# Patient Record
Sex: MALE | Race: WHITE | NOT HISPANIC OR LATINO | URBAN - METROPOLITAN AREA
[De-identification: names, ages, dates, MRNs, and addresses within clinical notes are randomized per-mention and may not be internally consistent; named-entity substitution may affect disease eponyms.]

---

## 2024-02-26 ENCOUNTER — LAB (OUTPATIENT)
Dept: LAB | Facility: LAB | Age: 39
End: 2024-02-26
Payer: COMMERCIAL

## 2024-02-26 DIAGNOSIS — Z79.899 OTHER LONG TERM (CURRENT) DRUG THERAPY: ICD-10-CM

## 2024-02-26 DIAGNOSIS — M25.50 PAIN IN UNSPECIFIED JOINT: Primary | ICD-10-CM

## 2024-02-26 LAB
ALBUMIN SERPL-MCNC: 4.9 G/DL (ref 3.5–5)
ALP BLD-CCNC: 58 U/L (ref 35–125)
ALT SERPL-CCNC: 27 U/L (ref 5–40)
AST SERPL-CCNC: 20 U/L (ref 5–40)
BASOPHILS # BLD AUTO: 0.02 X10*3/UL (ref 0–0.1)
BASOPHILS NFR BLD AUTO: 0.3 %
BILIRUB DIRECT SERPL-MCNC: <0.2 MG/DL (ref 0–0.2)
BILIRUB SERPL-MCNC: <0.2 MG/DL (ref 0.1–1.2)
CK SERPL-CCNC: 86 U/L (ref 24–195)
CRP SERPL-MCNC: <0.3 MG/DL (ref 0–2)
EOSINOPHIL # BLD AUTO: 0.05 X10*3/UL (ref 0–0.7)
EOSINOPHIL NFR BLD AUTO: 0.8 %
ERYTHROCYTE [DISTWIDTH] IN BLOOD BY AUTOMATED COUNT: 11.9 % (ref 11.5–14.5)
ERYTHROCYTE [SEDIMENTATION RATE] IN BLOOD BY WESTERGREN METHOD: 6 MM/H (ref 0–15)
HCT VFR BLD AUTO: 43.5 % (ref 41–52)
HGB BLD-MCNC: 14.6 G/DL (ref 13.5–17.5)
IMM GRANULOCYTES # BLD AUTO: 0.02 X10*3/UL (ref 0–0.7)
IMM GRANULOCYTES NFR BLD AUTO: 0.3 % (ref 0–0.9)
LYMPHOCYTES # BLD AUTO: 1.09 X10*3/UL (ref 1.2–4.8)
LYMPHOCYTES NFR BLD AUTO: 16.9 %
MCH RBC QN AUTO: 29.8 PG (ref 26–34)
MCHC RBC AUTO-ENTMCNC: 33.6 G/DL (ref 32–36)
MCV RBC AUTO: 89 FL (ref 80–100)
MONOCYTES # BLD AUTO: 0.44 X10*3/UL (ref 0.1–1)
MONOCYTES NFR BLD AUTO: 6.8 %
NEUTROPHILS # BLD AUTO: 4.82 X10*3/UL (ref 1.2–7.7)
NEUTROPHILS NFR BLD AUTO: 74.9 %
NRBC BLD-RTO: 0 /100 WBCS (ref 0–0)
PLATELET # BLD AUTO: 217 X10*3/UL (ref 150–450)
PROT SERPL-MCNC: 7.3 G/DL (ref 5.9–7.9)
RBC # BLD AUTO: 4.9 X10*6/UL (ref 4.5–5.9)
WBC # BLD AUTO: 6.4 X10*3/UL (ref 4.4–11.3)

## 2024-02-26 PROCEDURE — 36415 COLL VENOUS BLD VENIPUNCTURE: CPT

## 2024-02-26 PROCEDURE — 86140 C-REACTIVE PROTEIN: CPT

## 2024-02-26 PROCEDURE — 86038 ANTINUCLEAR ANTIBODIES: CPT

## 2024-02-26 PROCEDURE — 83519 RIA NONANTIBODY: CPT

## 2024-02-26 PROCEDURE — 82550 ASSAY OF CK (CPK): CPT

## 2024-02-26 PROCEDURE — 85025 COMPLETE CBC W/AUTO DIFF WBC: CPT

## 2024-02-26 PROCEDURE — 85652 RBC SED RATE AUTOMATED: CPT

## 2024-02-26 PROCEDURE — 83516 IMMUNOASSAY NONANTIBODY: CPT

## 2024-02-26 PROCEDURE — 80076 HEPATIC FUNCTION PANEL: CPT

## 2024-02-28 LAB
ACHR BLOCK AB/ACHR TOTAL SFR SER: 13 % (ref 0–26)
ANA SER QL HEP2 SUBST: NEGATIVE

## 2024-02-29 LAB
ACHR BIND AB SER-SCNC: 0 NMOL/L (ref 0–0.4)
ACHR MOD AB/ACHR TOTAL SFR SER: 1 %

## 2024-05-24 ENCOUNTER — LAB (OUTPATIENT)
Dept: LAB | Facility: LAB | Age: 39
End: 2024-05-24
Payer: COMMERCIAL

## 2024-05-24 DIAGNOSIS — M25.50 PAIN IN UNSPECIFIED JOINT: Primary | ICD-10-CM

## 2024-05-24 DIAGNOSIS — Z79.899 OTHER LONG TERM (CURRENT) DRUG THERAPY: ICD-10-CM

## 2024-05-24 LAB
ALBUMIN SERPL-MCNC: 5 G/DL (ref 3.5–5)
ALP BLD-CCNC: 54 U/L (ref 35–125)
ALT SERPL-CCNC: 24 U/L (ref 5–40)
AST SERPL-CCNC: 19 U/L (ref 5–40)
BASOPHILS # BLD AUTO: 0.03 X10*3/UL (ref 0–0.1)
BASOPHILS NFR BLD AUTO: 0.5 %
BILIRUB DIRECT SERPL-MCNC: <0.2 MG/DL (ref 0–0.2)
BILIRUB SERPL-MCNC: 0.3 MG/DL (ref 0.1–1.2)
CREAT SERPL-MCNC: 1.2 MG/DL (ref 0.4–1.6)
CRP SERPL-MCNC: <0.3 MG/DL (ref 0–2)
EGFRCR SERPLBLD CKD-EPI 2021: 80 ML/MIN/1.73M*2
EOSINOPHIL # BLD AUTO: 0.04 X10*3/UL (ref 0–0.7)
EOSINOPHIL NFR BLD AUTO: 0.6 %
ERYTHROCYTE [DISTWIDTH] IN BLOOD BY AUTOMATED COUNT: 11.7 % (ref 11.5–14.5)
ERYTHROCYTE [SEDIMENTATION RATE] IN BLOOD BY WESTERGREN METHOD: 2 MM/H (ref 0–15)
HCT VFR BLD AUTO: 44.2 % (ref 41–52)
HGB BLD-MCNC: 15.2 G/DL (ref 13.5–17.5)
IMM GRANULOCYTES # BLD AUTO: 0.04 X10*3/UL (ref 0–0.7)
IMM GRANULOCYTES NFR BLD AUTO: 0.6 % (ref 0–0.9)
LYMPHOCYTES # BLD AUTO: 1.28 X10*3/UL (ref 1.2–4.8)
LYMPHOCYTES NFR BLD AUTO: 20.7 %
MCH RBC QN AUTO: 30.1 PG (ref 26–34)
MCHC RBC AUTO-ENTMCNC: 34.4 G/DL (ref 32–36)
MCV RBC AUTO: 88 FL (ref 80–100)
MONOCYTES # BLD AUTO: 0.41 X10*3/UL (ref 0.1–1)
MONOCYTES NFR BLD AUTO: 6.6 %
NEUTROPHILS # BLD AUTO: 4.38 X10*3/UL (ref 1.2–7.7)
NEUTROPHILS NFR BLD AUTO: 71 %
NRBC BLD-RTO: 0 /100 WBCS (ref 0–0)
PLATELET # BLD AUTO: 231 X10*3/UL (ref 150–450)
PROT SERPL-MCNC: 7.4 G/DL (ref 5.9–7.9)
RBC # BLD AUTO: 5.05 X10*6/UL (ref 4.5–5.9)
WBC # BLD AUTO: 6.2 X10*3/UL (ref 4.4–11.3)

## 2024-05-24 PROCEDURE — 80076 HEPATIC FUNCTION PANEL: CPT

## 2024-05-24 PROCEDURE — 86140 C-REACTIVE PROTEIN: CPT

## 2024-05-24 PROCEDURE — 36415 COLL VENOUS BLD VENIPUNCTURE: CPT

## 2024-05-24 PROCEDURE — 85025 COMPLETE CBC W/AUTO DIFF WBC: CPT

## 2024-05-24 PROCEDURE — 85652 RBC SED RATE AUTOMATED: CPT

## 2024-05-24 PROCEDURE — 82565 ASSAY OF CREATININE: CPT

## 2024-08-08 ENCOUNTER — LAB (OUTPATIENT)
Dept: LAB | Facility: LAB | Age: 39
End: 2024-08-08
Payer: COMMERCIAL

## 2024-08-08 DIAGNOSIS — M06.4 INFLAMMATORY POLYARTHROPATHY (MULTI): Primary | ICD-10-CM

## 2024-08-08 DIAGNOSIS — Z79.899 OTHER LONG TERM (CURRENT) DRUG THERAPY: ICD-10-CM

## 2024-08-08 LAB
ALBUMIN SERPL-MCNC: 4.6 G/DL (ref 3.5–5)
ALP BLD-CCNC: 63 U/L (ref 35–125)
ALT SERPL-CCNC: 29 U/L (ref 5–40)
AST SERPL-CCNC: 24 U/L (ref 5–40)
BASOPHILS # BLD AUTO: 0.03 X10*3/UL (ref 0–0.1)
BASOPHILS NFR BLD AUTO: 0.5 %
BILIRUB DIRECT SERPL-MCNC: <0.2 MG/DL (ref 0–0.2)
BILIRUB SERPL-MCNC: 0.3 MG/DL (ref 0.1–1.2)
CREAT SERPL-MCNC: 0.9 MG/DL (ref 0.4–1.6)
CRP SERPL-MCNC: <0.3 MG/DL (ref 0–2)
EGFRCR SERPLBLD CKD-EPI 2021: >90 ML/MIN/1.73M*2
EOSINOPHIL # BLD AUTO: 0.11 X10*3/UL (ref 0–0.7)
EOSINOPHIL NFR BLD AUTO: 2 %
ERYTHROCYTE [DISTWIDTH] IN BLOOD BY AUTOMATED COUNT: 12.2 % (ref 11.5–14.5)
ERYTHROCYTE [SEDIMENTATION RATE] IN BLOOD BY WESTERGREN METHOD: 4 MM/H (ref 0–15)
HCT VFR BLD AUTO: 44.4 % (ref 41–52)
HGB BLD-MCNC: 15 G/DL (ref 13.5–17.5)
IMM GRANULOCYTES # BLD AUTO: 0.02 X10*3/UL (ref 0–0.7)
IMM GRANULOCYTES NFR BLD AUTO: 0.4 % (ref 0–0.9)
LYMPHOCYTES # BLD AUTO: 1.27 X10*3/UL (ref 1.2–4.8)
LYMPHOCYTES NFR BLD AUTO: 23.2 %
MCH RBC QN AUTO: 30.3 PG (ref 26–34)
MCHC RBC AUTO-ENTMCNC: 33.8 G/DL (ref 32–36)
MCV RBC AUTO: 90 FL (ref 80–100)
MONOCYTES # BLD AUTO: 0.37 X10*3/UL (ref 0.1–1)
MONOCYTES NFR BLD AUTO: 6.8 %
NEUTROPHILS # BLD AUTO: 3.68 X10*3/UL (ref 1.2–7.7)
NEUTROPHILS NFR BLD AUTO: 67.1 %
NRBC BLD-RTO: 0 /100 WBCS (ref 0–0)
PLATELET # BLD AUTO: 216 X10*3/UL (ref 150–450)
PROT SERPL-MCNC: 6.9 G/DL (ref 5.9–7.9)
RBC # BLD AUTO: 4.95 X10*6/UL (ref 4.5–5.9)
WBC # BLD AUTO: 5.5 X10*3/UL (ref 4.4–11.3)

## 2024-08-08 PROCEDURE — 85025 COMPLETE CBC W/AUTO DIFF WBC: CPT

## 2024-08-08 PROCEDURE — 86140 C-REACTIVE PROTEIN: CPT

## 2024-08-08 PROCEDURE — 80076 HEPATIC FUNCTION PANEL: CPT

## 2024-08-08 PROCEDURE — 36415 COLL VENOUS BLD VENIPUNCTURE: CPT

## 2024-08-08 PROCEDURE — 85652 RBC SED RATE AUTOMATED: CPT

## 2024-08-08 PROCEDURE — 82565 ASSAY OF CREATININE: CPT

## 2024-08-12 ENCOUNTER — TELEMEDICINE (OUTPATIENT)
Dept: UROLOGY | Facility: HOSPITAL | Age: 39
End: 2024-08-12
Payer: COMMERCIAL

## 2024-08-12 ENCOUNTER — TELEPHONE (OUTPATIENT)
Dept: UROLOGY | Facility: HOSPITAL | Age: 39
End: 2024-08-12

## 2024-08-12 DIAGNOSIS — N20.0 KIDNEY STONES: Primary | ICD-10-CM

## 2024-08-12 PROBLEM — R00.2 PALPITATIONS: Status: ACTIVE | Noted: 2024-08-12

## 2024-08-12 PROBLEM — N13.30 HYDRONEPHROSIS: Status: ACTIVE | Noted: 2024-08-12

## 2024-08-12 PROBLEM — N20.1 CALCULUS OF URETER: Status: ACTIVE | Noted: 2024-08-12

## 2024-08-12 PROBLEM — R07.9 CHEST PAIN: Status: ACTIVE | Noted: 2024-08-12

## 2024-08-12 PROBLEM — M54.50 LOW BACK PAIN, UNSPECIFIED: Status: ACTIVE | Noted: 2023-10-24

## 2024-08-12 PROBLEM — M22.2X9 PATELLOFEMORAL PAIN SYNDROME: Status: ACTIVE | Noted: 2022-03-11

## 2024-08-12 PROCEDURE — G2211 COMPLEX E/M VISIT ADD ON: HCPCS | Performed by: NURSE PRACTITIONER

## 2024-08-12 PROCEDURE — 1036F TOBACCO NON-USER: CPT | Performed by: NURSE PRACTITIONER

## 2024-08-12 PROCEDURE — 99214 OFFICE O/P EST MOD 30 MIN: CPT | Performed by: NURSE PRACTITIONER

## 2024-08-12 RX ORDER — LISDEXAMFETAMINE DIMESYLATE 30 MG/1
30 CAPSULE ORAL
COMMUNITY

## 2024-08-12 RX ORDER — HYDROXYCHLOROQUINE SULFATE 200 MG/1
1 TABLET, FILM COATED ORAL 2 TIMES DAILY
COMMUNITY

## 2024-08-12 RX ORDER — DICLOFENAC SODIUM 50 MG/1
50 TABLET, DELAYED RELEASE ORAL 3 TIMES DAILY PRN
COMMUNITY
Start: 2024-05-21

## 2024-08-12 RX ORDER — FOLIC ACID 1 MG/1
1 TABLET ORAL DAILY
COMMUNITY
Start: 2024-06-18

## 2024-08-12 RX ORDER — TAMSULOSIN HYDROCHLORIDE 0.4 MG/1
0.4 CAPSULE ORAL DAILY
Qty: 14 CAPSULE | Refills: 0 | Status: SHIPPED | OUTPATIENT
Start: 2024-08-12 | End: 2024-08-26

## 2024-08-12 RX ORDER — MELOXICAM 15 MG/1
1 TABLET ORAL
COMMUNITY
Start: 2024-01-25

## 2024-08-12 RX ORDER — METHOTREXATE 2.5 MG/1
2.5 TABLET ORAL
COMMUNITY

## 2024-08-12 RX ORDER — NABUMETONE 750 MG/1
750 TABLET, FILM COATED ORAL 2 TIMES DAILY
COMMUNITY
Start: 2024-02-20

## 2024-08-12 NOTE — PROGRESS NOTES
[facility]  Outpatient Clinic Note    Patient Name:  Matt Leiva Jr.  MRN:  [id number]  :  1985  Date of Service: 2024     Visit type: Follow up visit    Last seen - 22 with Courtney Behmlander, CNP    Virtual or Telephone Consent    An interactive audio and video telecommunication system which permits real time communications between the patient (at the originating site) and provider (at the distant site) was utilized to provide this telehealth service.   Verbal consent was requested and obtained from Matt Leiva Jr. on this date, 24 for a telehealth visit.      Problem list/Chief complaints:  History of Kidney stones  Ureteral stone with hydronephrosis    HPI    Interval History:  Matt Leiva Jr. is a 38 y.o. male who is being seen today for kidney stones. I performed a detailed review of the medical chart records lab testing and imaging since last visit. Patient states he started to experience left flank pain about 9 days ago. He denies urgency or frequency, no fever or chills, no hematuria. He does not have the same pain as when he had with obstructed kidney stone causing hydronephrosis. He took some left over Tamsulosin from when he had kidneys tone 2 years ago but does not think he has passed a stone yet. He drinks about  oz of water a day, he does not eat a lot of salty foods but does eat animal protein daily. His wife is currently in labor and is unsure when he can get scan completed.     I personally reviewed US renal dated 22.   IMPRESSION:  1.  Normal sonographic appearance of both kidneys.  2. The previously described 0.3 cm calculus at the left  ureter-vesicle junction is not visualized. There is no left-sided  collecting system dilatation and the left ureteral jet is seen.  3. Incidentally visualized hepatic steatosis.    I personally reviewed CT AP dated 22.   IMPRESSION:  1.  3 mm obstructing calculus in the distal left ureter with  minimal  hydronephrosis.      History reviewed. No pertinent past medical history.    History reviewed. No pertinent surgical history.    Social History     Socioeconomic History    Marital status: Single     Spouse name: Not on file    Number of children: Not on file    Years of education: Not on file    Highest education level: Not on file   Occupational History    Not on file   Tobacco Use    Smoking status: Not on file    Smokeless tobacco: Not on file   Substance and Sexual Activity    Alcohol use: Not on file    Drug use: Not on file    Sexual activity: Not on file   Other Topics Concern    Not on file   Social History Narrative    Not on file     Social Determinants of Health     Financial Resource Strain: Not on file   Food Insecurity: Not on file   Transportation Needs: Not on file   Physical Activity: Not on file   Stress: Not on file   Social Connections: Not on file   Intimate Partner Violence: Not on file   Housing Stability: Not on file       Not on File     No current outpatient medications on file.     Review of system:  All other systems have been reviewed and are negative for complaints      Last recorded vitals:  There were no vitals taken for this visit.    Physical Exam:  General: Appears comfortable and in no apparent distress  Head: Normocephalic, atraumatic  Lungs: Breathing is easy, non-labored. Speaking in clear and complete sentences. Normal diaphragmatic movement.  Neurologic: Alert, oriented to person, place, and time  Psychiatric: mood and affect appropriate      Labs  Lab on 08/08/2024   Component Date Value    C-Reactive Protein 08/08/2024 <0.30     Sedimentation Rate 08/08/2024 4     WBC 08/08/2024 5.5     nRBC 08/08/2024 0.0     RBC 08/08/2024 4.95     Hemoglobin 08/08/2024 15.0     Hematocrit 08/08/2024 44.4     MCV 08/08/2024 90     MCH 08/08/2024 30.3     MCHC 08/08/2024 33.8     RDW 08/08/2024 12.2     Platelets 08/08/2024 216     Neutrophils % 08/08/2024 67.1     Immature  Granulocytes %,* 08/08/2024 0.4     Lymphocytes % 08/08/2024 23.2     Monocytes % 08/08/2024 6.8     Eosinophils % 08/08/2024 2.0     Basophils % 08/08/2024 0.5     Neutrophils Absolute 08/08/2024 3.68     Immature Granulocytes Ab* 08/08/2024 0.02     Lymphocytes Absolute 08/08/2024 1.27     Monocytes Absolute 08/08/2024 0.37     Eosinophils Absolute 08/08/2024 0.11     Basophils Absolute 08/08/2024 0.03     Creatinine 08/08/2024 0.90     eGFR 08/08/2024 >90     AST 08/08/2024 24     ALT 08/08/2024 29     Alkaline Phosphatase 08/08/2024 63     Bilirubin, Total 08/08/2024 0.3     Bilirubin, Direct 08/08/2024 <0.2     Total Protein 08/08/2024 6.9     Albumin 08/08/2024 4.6        Imaging  US renal complete  MRN: [id number]  Patient Name: LUISA LUCIANO     STUDY:  US RENAL BILAT;  8/12/2022 1:56 pm     INDICATION:  distal left ureteral stone and minimal hydronephrosis on CT.     COMPARISON:  CT abdomen and pelvis 07/22/2022     ACCESSION NUMBER(S):  [id number]     ORDERING CLINICIAN:  COURTNEY BEHMLANDER     TECHNIQUE:  Multiple images of the kidneys were obtained.     FINDINGS:  RIGHT KIDNEY:  Right kidney is normal size, 11.7 cm in length. Renal cortical  thickness and echogenicity are grossly normal. No hydronephrosis or  nephrolithiasis is identified.     LEFT KIDNEY:  Left kidney is normal size, 11.5 cm in length. Renal cortical  thickness and echogenicity are grossly normal. No hydronephrosis or  nephrolithiasis is identified.     BLADDER:  Urinary bladder is incompletely distended and suboptimally evaluated  but grossly unremarkable. The left ureteral jet is visualized. No  calculi are visualized at the ureter-vesicle junction.     The prostate gland is normal size.     Incidentally seen is echogenic appearance of the liver.     IMPRESSION:  1.  Normal sonographic appearance of both kidneys.  2. The previously described 0.3 cm calculus at the left  ureter-vesicle junction is not visualized. There is no  left-sided  collecting system dilatation and the left ureteral jet is seen.  3. Incidentally visualized hepatic steatosis.     I personally reviewed the images/study and I agree with the findings  as stated by resident physician Dr. Edgar Barahona.        Assessment and Plan:  Matt Leiva Jr. is a 38 y.o. male with history of autoimmune disorder and Kidney stones who is being seen for follow up.     1.We discussed that most calculi under 5 mm can be safely observed. This recommendation is based on large series looking at spontaneous passage rates that suggest that the likelihood of stone passage is highest for stones under 4 mm in size (approximately 70-80%), moderate for stones between 4 and 6 mm (50%), and lowest for stones 6 mm or greater (20%-30%).     If unable to pass symptomatic/obstructing stone under 5 mm within 4-6 weeks, intervention is recommended.    For asymptomatic, non-infected, non-obstructing calyceal stone, then observation is an option. Will repeat Renal US in 6 months.    However, stones under 5 mm that are in a solitary kidney, associated with infection or causing significant obstruction should be removed to prevent loss of renal function and/or sepsis. Also, every patient has different anatomy and different ability to pass calculi and tolerate pain, so intervention is also recommended in patients with small stones that are in significant discomfort or that have a history of being unable to pass small calculi.     Discussed possible interventions including: Extracorporeal shock wave lithotripsy (ESWL) for stones less than 1.5 cm. Ureteroscopy with stent placement and Percutaneous nephrolithotomy (PCNL)- minimally invasive surgery to remove stones bigger than 2 cm.    Patient understands and desires to proceed with CT AP to assess kidney stone.    Reviewed dietary modifications required to help prevent recurrent stone formation:  1. Oral fluid intake sufficient to produce at least 2.5 liters of  urine per day-the ideal oral fluids have high citrate content (such as lemonade or orange juice). Minimize carbonated drinks that contain phosphoric acid (ex/ dark dain)  2. Low sodium diet (< or = 2000mg/day), sodium intake increases urinary calcium  3. Low intake of animal protein (anything with a face)- limiting intake to <8 ounces/daily  4. Low oxalate diet; calcium intake with high oxalate foods  7. Moderate calcium intake (9982-2796 mg/day)-risk for stone formation increases when oral calcium intake is either too low or too high  8. Avoid high doses of Vitamin C (>500mg), as it metabolizes to oxalate  9. High fiber diet may reduce stone risk  10. Exercise!       Plan:  -Patient will get CT AP without contrast in ER after wife has given birth  -CT AP without contrast ordered if patient is unable to go to ER  -Prescription for Tamsulosin 0.4 mg daily for MET sent to patient's pharmacy. Common SE discussed.  -Unable to prescribe Toradol due to patient autoimmune medication interference  -Patient was advised to present to local Emergency Department for development of fevers, chills, nausea, vomiting or flank pain that is not controlled with oral pain medication.    Follow-up in 2 weeks virtually, or sooner if needed, to reassess symptoms and for medication refill.    All questions and concerns were addressed. Patient verbalizes understanding and has no other questions at this time.     Some elements copied from Courtney Behmlander, CNP note on 8/18/24, the elements have been updated and all reflect current decision making from today, 08/12/24    E&M visit today is associated with current or anticipated ongoing medical care services related to a patient's single, serious condition or a complex condition.    KATIE Ashton-CNP   [facility]  08/12/24 10:35 AM

## 2024-08-31 ENCOUNTER — HOSPITAL ENCOUNTER (OUTPATIENT)
Dept: RADIOLOGY | Facility: HOSPITAL | Age: 39
Discharge: HOME | End: 2024-08-31
Payer: COMMERCIAL

## 2024-08-31 DIAGNOSIS — N20.0 KIDNEY STONES: ICD-10-CM

## 2024-08-31 PROCEDURE — 74176 CT ABD & PELVIS W/O CONTRAST: CPT

## 2024-08-31 PROCEDURE — 74176 CT ABD & PELVIS W/O CONTRAST: CPT | Performed by: RADIOLOGY

## 2024-09-04 NOTE — PROGRESS NOTES
[facility]  Outpatient Clinic Note    Patient Name:  Matt Leiva Jr.  MRN:  [id number]  :  1985  Date of Service: 2024     Visit type: Follow up visit    Virtual or Telephone Consent    An interactive audio and video telecommunication system which permits real time communications between the patient (at the originating site) and provider (at the distant site) was utilized to provide this telehealth service.   Verbal consent was requested and obtained from Matt Leiva Jr. on this date, 24 for a telehealth visit.     Matt Leiva Jr. Is a 38 y.o. male who is being seen for the  problems listed below.     Problem list/Chief complaints:  History of Kidney stones  Ureteral stone with hydronephrosis    HPI    Interval History:  24: Patient is being seen virtually today for kidney stones.Patient states he started to experience left flank pain about 9 days ago. He denies urgency or frequency, no fever or chills, no hematuria. He does not have the same pain as when he had with obstructed kidney stone causing hydronephrosis. He took some left over Tamsulosin from when he had kidneys tone 2 years ago but does not think he has passed a stone yet. He drinks about  oz of water a day, he does not eat a lot of salty foods but does eat animal protein daily. His wife is currently in labor and is unsure when he can get scan completed.     24: Patient is being seen virtually for follow up and to review CT scan. He states that his rheumatologist looked at the scan and determined that the pain he was having if from his Crohn's disease. He was started on a steroid taper for inflammatory arthritis and is scheduled to have a colonoscopy. He denies urgency, no frequency, no fever or chills, no hematuria.     History reviewed. No pertinent past medical history.    History reviewed. No pertinent surgical history.    Social History     Socioeconomic History    Marital status:      Spouse name:  Not on file    Number of children: Not on file    Years of education: Not on file    Highest education level: Not on file   Occupational History    Not on file   Tobacco Use    Smoking status: Never    Smokeless tobacco: Never   Substance and Sexual Activity    Alcohol use: Not on file    Drug use: Not on file    Sexual activity: Not on file   Other Topics Concern    Not on file   Social History Narrative    Not on file     Social Determinants of Health     Financial Resource Strain: Not on file   Food Insecurity: Not on file   Transportation Needs: Not on file   Physical Activity: Not on file   Stress: Not on file   Social Connections: Not on file   Intimate Partner Violence: Not on file   Housing Stability: Not on file       Allergies   Allergen Reactions    Sulfa (Sulfonamide Antibiotics) Unknown          Current Outpatient Medications:     predniSONE (Deltasone) 5 mg tablet, Take 1 tablet (5 mg) by mouth once daily. 3 tablets once a day for 5 days, 2 tablets once a day for 5 days, 1 tablet once a day for 5 days Orally for 15 days, Disp: , Rfl:     diclofenac (Voltaren) 50 mg EC tablet, Take 1 tablet (50 mg) by mouth 3 times a day as needed., Disp: , Rfl:     folic acid (Folvite) 1 mg tablet, Take 1 tablet (1 mg) by mouth once daily., Disp: , Rfl:     hydroxychloroquine (Plaquenil) 200 mg tablet, Take 1 tablet (200 mg) by mouth 2 times a day., Disp: , Rfl:     meloxicam (Mobic) 15 mg tablet, Take 1 tablet (15 mg) by mouth early in the morning.., Disp: , Rfl:     methotrexate (Trexall) 2.5 mg tablet, Take 1 tablet (2.5 mg total) by mouth 1 (one) time per week., Disp: , Rfl:     nabumetone (Relafen) 750 mg tablet, Take 1 tablet (750 mg) by mouth 2 times a day., Disp: , Rfl:     Vyvanse 30 mg capsule, Take 1 capsule (30 mg) by mouth once daily in the morning. Take before meals., Disp: , Rfl:      Review of system:  All other systems have been reviewed and are negative for complaints      Last recorded vitals:  There  were no vitals taken for this visit.    Physical Exam:  General: Appears comfortable and in no apparent distress  Head: Normocephalic, atraumatic  Lungs: Breathing is easy, non-labored. Speaking in clear and complete sentences. Normal diaphragmatic movement.  Neurologic: Alert, oriented to person, place, and time  Psychiatric: mood and affect appropriate      Labs  Lab on 08/08/2024   Component Date Value    C-Reactive Protein 08/08/2024 <0.30     Sedimentation Rate 08/08/2024 4     WBC 08/08/2024 5.5     nRBC 08/08/2024 0.0     RBC 08/08/2024 4.95     Hemoglobin 08/08/2024 15.0     Hematocrit 08/08/2024 44.4     MCV 08/08/2024 90     MCH 08/08/2024 30.3     MCHC 08/08/2024 33.8     RDW 08/08/2024 12.2     Platelets 08/08/2024 216     Neutrophils % 08/08/2024 67.1     Immature Granulocytes %,* 08/08/2024 0.4     Lymphocytes % 08/08/2024 23.2     Monocytes % 08/08/2024 6.8     Eosinophils % 08/08/2024 2.0     Basophils % 08/08/2024 0.5     Neutrophils Absolute 08/08/2024 3.68     Immature Granulocytes Ab* 08/08/2024 0.02     Lymphocytes Absolute 08/08/2024 1.27     Monocytes Absolute 08/08/2024 0.37     Eosinophils Absolute 08/08/2024 0.11     Basophils Absolute 08/08/2024 0.03     Creatinine 08/08/2024 0.90     eGFR 08/08/2024 >90     AST 08/08/2024 24     ALT 08/08/2024 29     Alkaline Phosphatase 08/08/2024 63     Bilirubin, Total 08/08/2024 0.3     Bilirubin, Direct 08/08/2024 <0.2     Total Protein 08/08/2024 6.9     Albumin 08/08/2024 4.6        Imaging  CT abdomen pelvis wo IV contrast  Narrative: Interpreted By:  Arpit Navarro,   STUDY:  CT ABDOMEN PELVIS WO IV CONTRAST;  8/31/2024 2:37 pm      INDICATION:  Signs/Symptoms:History of kidney stones with current symptoms.      COMPARISON:  07/22/2022      ACCESSION NUMBER(S):  [id number]      ORDERING CLINICIAN:  DEEPAK PEÑALOZA      TECHNIQUE:  CT of the abdomen and pelvis was performed. Contiguous axial images  were obtained at 3 mm slice thickness through the  abdomen and pelvis.  Coronal and sagittal reconstructions at 3 mm slice thickness were  performed.  No intravenous contrast was administered.      FINDINGS:  Please note that the evaluation of vessels, lymph nodes and organs is  limited without intravenous contrast.      LOWER CHEST:  Similar partially calcified irregular right medial lung base nodule  measuring approximate 1.5 cm and small adjacent regional nodularity  up to 8 mm, overall grossly similar to prior. Unchanged approximate 7  mm nodularity along the right major fissure. Unchanged additional  tiny probably calcified nodule at the left lower lobe. Small hiatal  hernia. Calcified adjacent distal paraesophageal node probably  sequela of granulomatous disease.      ABDOMEN:      LIVER:  A few small left hepatic lobe hypoattenuating foci up to 6 mm, too  small to characterize.      BILE DUCTS:  No significant biliary ductal dilatation.      GALLBLADDER:  Contracted. No definite opaque gallstones.      PANCREAS:  Within normal limits.      SPLEEN:  Mildly enlarged up to 12.8 cm craniocaudal similar to prior      ADRENAL GLANDS:  Within normal limits.      KIDNEYS AND URETERS:  No renal stones or hydronephrosis.Distal left ureteral stone present  previously is no longer seen. Mild nonspecific perinephric stranding  left greater than right but less pronounced compared to that which  was present previously.      PELVIS:      BLADDER:  Mild nonspecific urinary bladder wall thickening.      REPRODUCTIVE ORGANS:  Prostate gland borderline enlarged but stable in appearance. No  definite new suspicious pelvic masses.      BOWEL:  Stomach poorly distended limiting evaluation. Mild prominent  fecalized terminal ileum could reflect sequela of ileus and/or slow  transit. No definite bowel obstruction. Prominent colonic stool. The  appendix appears within normal limits. Submucosal fat deposition  involving portions of colon can be seen in the setting of  chronic  inflammation. Wall thickening versus incomplete distention at the  sigmoid colon. No definite regional inflammatory changes. Mild  thickened appearance at the rectum with suggestion of possible mild  regional stranding although unchanged compared to prior.      VESSELS:  No significantatherosclerotic changes are noted involving the aorta  and branching vessels. There is no aneurysmal dilatation. IVC appears  normal in caliber.      PERITONEUM/RETROPERITONEUM/LYMPH NODES:  No ascites or free air, no fluid collection.  Mildly prominent  nonspecific mesenteric nodes up to 8 mm short axis at the right lower  abdomen similar to prior. Mildly prominent nonspecific inguinal nodes  up to 11 mm short axis also similar. Mildly prominent nonspecific  terry hepatis/peripancreatic nodes up to 11 mm short axis also  similar.      ABDOMINAL WALL:  Unchanged small fat containing umbilical hernia.      BONES:  No definite new suspicious osseous lesions are identified. Multilevel  degenerative changes visualized spine redemonstrated. Disc osteophyte  complex at T12-L1 redemonstrated causing mild to moderate central  canal stenosis and also at T10-11 with mild central canal stenosis  similar to prior. Mild compression deformity at T11 similar to prior.  Similar disc protrusion/osteophyte complex L4-L5 with moderate  central canal stenosis. Similar approximate 1.6 cm focal  hypoattenuation at the right S2 sacral foramen image 110, could  represent perineural cyst.      Impression: 1.  No evidence for obstructive uropathy. No urinary tract stones.  Mild nonspecific perinephric stranding left greater than right but  less pronounced compared to that which was present previously.  2. Prostate gland borderline enlarged but stable in appearance. Mild  nonspecific urinary bladder wall thickening.  3. Mildly prominent fecalized terminal ileum could reflect sequela of  mild ileus and/or slow transit. Submucosal fat deposition  involving  portions of colon can be seen in the setting of chronic inflammation.  Wall thickening versus incomplete distention at the sigmoid colon and  mild thickened appearance at the rectum with suggestion of possible  mild regional perirectal stranding although unchanged in appearance  when compared to prior. A mild inflammatory or neoplastic involvement  cannot be entirely excluded. Clinical correlation and follow-up  advised. Correlation with colonoscopy may be considered as a means of  further assessment.  4. Similar mildly prominent nonspecific abdominopelvic nodes as above.  5. Mild splenomegaly.  6. Multilevel degenerative changes visualized spine redemonstrated.  Disc protrusion/osteophyte complexes redemonstrated most notably at  T12-L1 and L4-L5 causing central canal stenosis and also focal  hypoattenuation at the right S2 sacral foramen which may represent  perineural cyst among other etiologies however overall similar to  prior. Correlation with MRI may be considered for further assessment  if clinically warranted.  7. Similar partially calcified irregular right medial lung base  nodule and adjacent nodularity, probably of benign etiology.  Correlation with dedicated complete CT chest may be considered for  further assessment.  8. Additional findings as above.          MACRO:  None      Signed by: Arpit Navarro 9/3/2024 9:43 AM  Dictation workstation:   [id number]        Assessment and Plan:  Matt Leiva Jr. is a 38 y.o. male with history of autoimmune disorder and Kidney stones who is being seen virtually for follow up.       Plan:  -Reviewed CT scan results with patient  -UA, culture and sensitivity ordered to rule out infection due to bladder wall thickening seen on CT scan. Will call patient with results.  -Plan to refer for cystoscopy if urine culture is negative to evaluate bladder wall thickening  -Screening PSA ordered in 6 months due to enlarged prostate seen on recent CT and family history  of prostate cancer  -Patient was advised to present to local Emergency Department for development of fevers, chills, nausea, vomiting or flank pain that is not controlled with oral pain medication.  -Follow-up in 6 months with PSA or sooner if needed, to reassess symptoms and for medication refill.    All questions and concerns were addressed. Patient verbalizes understanding and has no other questions at this time.     Some elements copied from my note on 8/12/24, the elements have been updated and all reflect current decision making from today, 09/05/24    E&M visit today is associated with current or anticipated ongoing medical care services related to a patient's single, serious condition or a complex condition.    I performed this visit using real-time telehealth tools, including an audio/video connection between Matt Leiva Jr., in patient's home and PALMA Ashton, in clinic.      PALMA Ashton   [facility]  09/05/24 12:05 PM

## 2024-09-05 ENCOUNTER — TELEMEDICINE (OUTPATIENT)
Dept: UROLOGY | Facility: HOSPITAL | Age: 39
End: 2024-09-05
Payer: COMMERCIAL

## 2024-09-05 ENCOUNTER — TELEPHONE (OUTPATIENT)
Dept: UROLOGY | Facility: CLINIC | Age: 39
End: 2024-09-05

## 2024-09-05 DIAGNOSIS — N32.89 BLADDER WALL THICKENING: ICD-10-CM

## 2024-09-05 DIAGNOSIS — N20.0 KIDNEY STONES: Primary | ICD-10-CM

## 2024-09-05 DIAGNOSIS — N40.0 ENLARGED PROSTATE: ICD-10-CM

## 2024-09-05 PROCEDURE — 1036F TOBACCO NON-USER: CPT | Performed by: NURSE PRACTITIONER

## 2024-09-05 PROCEDURE — 99214 OFFICE O/P EST MOD 30 MIN: CPT | Performed by: NURSE PRACTITIONER

## 2024-09-05 RX ORDER — PREDNISONE 5 MG/1
5 TABLET ORAL DAILY
COMMUNITY
Start: 2024-09-03

## 2024-09-11 ENCOUNTER — APPOINTMENT (OUTPATIENT)
Dept: RADIOLOGY | Facility: HOSPITAL | Age: 39
End: 2024-09-11
Payer: COMMERCIAL

## 2024-11-25 PROCEDURE — 88305 TISSUE EXAM BY PATHOLOGIST: CPT | Performed by: STUDENT IN AN ORGANIZED HEALTH CARE EDUCATION/TRAINING PROGRAM

## 2024-11-25 PROCEDURE — 88305 TISSUE EXAM BY PATHOLOGIST: CPT

## 2024-11-26 ENCOUNTER — LAB REQUISITION (OUTPATIENT)
Dept: LAB | Facility: HOSPITAL | Age: 39
End: 2024-11-26
Payer: COMMERCIAL

## 2024-11-26 DIAGNOSIS — R93.89 ABNORMAL FINDINGS ON DIAGNOSTIC IMAGING OF OTHER SPECIFIED BODY STRUCTURES: ICD-10-CM

## 2024-12-05 LAB
LABORATORY COMMENT REPORT: NORMAL
PATH REPORT.FINAL DX SPEC: NORMAL
PATH REPORT.GROSS SPEC: NORMAL
PATH REPORT.TOTAL CANCER: NORMAL

## 2024-12-22 ENCOUNTER — OFFICE VISIT (OUTPATIENT)
Dept: URGENT CARE | Age: 39
End: 2024-12-22
Payer: COMMERCIAL

## 2024-12-22 VITALS
DIASTOLIC BLOOD PRESSURE: 84 MMHG | RESPIRATION RATE: 18 BRPM | HEART RATE: 83 BPM | OXYGEN SATURATION: 97 % | SYSTOLIC BLOOD PRESSURE: 144 MMHG | TEMPERATURE: 98.6 F

## 2024-12-22 DIAGNOSIS — J06.9 UPPER RESPIRATORY TRACT INFECTION, UNSPECIFIED TYPE: Primary | ICD-10-CM

## 2024-12-22 DIAGNOSIS — J02.9 SORE THROAT: ICD-10-CM

## 2024-12-22 LAB — POC RAPID STREP: NEGATIVE

## 2024-12-22 PROCEDURE — 1036F TOBACCO NON-USER: CPT | Performed by: PHYSICIAN ASSISTANT

## 2024-12-22 PROCEDURE — 99203 OFFICE O/P NEW LOW 30 MIN: CPT | Performed by: PHYSICIAN ASSISTANT

## 2024-12-22 PROCEDURE — 87880 STREP A ASSAY W/OPTIC: CPT | Performed by: PHYSICIAN ASSISTANT

## 2024-12-22 PROCEDURE — 87651 STREP A DNA AMP PROBE: CPT

## 2024-12-22 RX ORDER — AZITHROMYCIN 250 MG/1
TABLET, FILM COATED ORAL
Qty: 6 TABLET | Refills: 0 | Status: SHIPPED | OUTPATIENT
Start: 2024-12-22

## 2024-12-22 RX ORDER — BENZONATATE 200 MG/1
200 CAPSULE ORAL 3 TIMES DAILY PRN
Qty: 30 CAPSULE | Refills: 0 | Status: SHIPPED | OUTPATIENT
Start: 2024-12-22 | End: 2024-12-29

## 2024-12-22 RX ORDER — METHYLPREDNISOLONE 4 MG/1
TABLET ORAL
Qty: 21 TABLET | Refills: 0 | Status: SHIPPED | OUTPATIENT
Start: 2024-12-22 | End: 2024-12-28

## 2024-12-22 ASSESSMENT — ENCOUNTER SYMPTOMS
FATIGUE: 1
FEVER: 1
COUGH: 1
SORE THROAT: 1

## 2024-12-22 NOTE — PROGRESS NOTES
"Subjective   Patient ID: Matt Leiva Jr. \"Mihir" is a 39 y.o. male. They present today with a chief complaint of Sore Throat (Monday x sore throat).    History of Present Illness  Ill for several days with severe cough productive of yellow mucus, ST with white spots in his throat, slight fevers, malaise and fatigue.      History provided by:  Patient   used: No    Sore Throat   Associated symptoms include coughing.       Past Medical History  Allergies as of 12/22/2024 - Reviewed 12/22/2024   Allergen Reaction Noted    Sulfa (sulfonamide antibiotics) Unknown 01/13/2022       (Not in a hospital admission)       History reviewed. No pertinent past medical history.    History reviewed. No pertinent surgical history.     reports that he has never smoked. He has never used smokeless tobacco.    Review of Systems  Review of Systems   Constitutional:  Positive for fatigue and fever.   HENT:  Positive for sore throat.    Respiratory:  Positive for cough.                                   Objective    Vitals:    12/22/24 0820   BP: 144/84   Pulse: 83   Resp: 18   Temp: 37 °C (98.6 °F)   SpO2: 97%     No LMP for male patient.    Physical Exam  Vitals and nursing note reviewed.   Constitutional:       Appearance: Normal appearance.      Comments: Pleasant, cooperative 38 YO male, NAD   HENT:      Head: Normocephalic and atraumatic.      Right Ear: Tympanic membrane, ear canal and external ear normal.      Left Ear: Tympanic membrane, ear canal and external ear normal.      Nose: Congestion present.      Mouth/Throat:      Mouth: Mucous membranes are moist.      Pharynx: Posterior oropharyngeal erythema present. No oropharyngeal exudate.      Comments: +PND, no edema, + some redness, uvula midline.  Eyes:      General: No scleral icterus.        Right eye: No discharge.         Left eye: No discharge.      Extraocular Movements: Extraocular movements intact.      Conjunctiva/sclera: Conjunctivae normal.      " Pupils: Pupils are equal, round, and reactive to light.   Cardiovascular:      Rate and Rhythm: Normal rate and regular rhythm.      Heart sounds: Normal heart sounds.   Pulmonary:      Effort: Pulmonary effort is normal. No respiratory distress.      Breath sounds: Normal breath sounds.   Musculoskeletal:      Cervical back: Normal range of motion. No rigidity.   Lymphadenopathy:      Cervical: No cervical adenopathy.   Skin:     General: Skin is warm and dry.   Neurological:      General: No focal deficit present.      Mental Status: He is alert and oriented to person, place, and time.   Psychiatric:         Mood and Affect: Mood normal.         Behavior: Behavior normal.         Procedures    Point of Care Test & Imaging Results from this visit  Results for orders placed or performed in visit on 12/22/24   POCT rapid strep A manually resulted   Result Value Ref Range    POC Rapid Strep Negative Negative      No results found.    Diagnostic study results (if any) were reviewed by Fatou Rice PA-C.    Assessment/Plan   Allergies, medications, history, and pertinent labs/EKGs/Imaging reviewed by Fatou Rice PA-C.     Medical Decision Making  H & P consistent with respiratory infection.  C/O ST, but throat appears mostly normal with copius PND.  Out of an abundance of caution, will cover him for atypicals with zithromax, and get strep PCR.  Will treat sx with medrol dose pack (he is no longer taking prednisone for his autoimmune disease) and tessalon perles.  If he feels worse, return to  or see PCP.  If he develops severe or conserning sx to ER.     Orders and Diagnoses  Diagnoses and all orders for this visit:  Sore throat  -     POCT rapid strep A manually resulted      Medical Admin Record      Patient disposition: Home    Electronically signed by Fatou Rice PA-C  8:43 AM

## 2024-12-23 LAB — S PYO DNA THROAT QL NAA+PROBE: NOT DETECTED

## 2025-04-08 ENCOUNTER — OFFICE VISIT (OUTPATIENT)
Dept: URGENT CARE | Age: 40
End: 2025-04-08
Payer: COMMERCIAL

## 2025-04-08 VITALS
RESPIRATION RATE: 16 BRPM | WEIGHT: 240 LBS | SYSTOLIC BLOOD PRESSURE: 153 MMHG | DIASTOLIC BLOOD PRESSURE: 91 MMHG | HEART RATE: 93 BPM | TEMPERATURE: 97.8 F | OXYGEN SATURATION: 98 % | BODY MASS INDEX: 32.51 KG/M2 | HEIGHT: 72 IN

## 2025-04-08 DIAGNOSIS — J32.9 BACTERIAL SINUSITIS: Primary | ICD-10-CM

## 2025-04-08 DIAGNOSIS — B96.89 BACTERIAL SINUSITIS: Primary | ICD-10-CM

## 2025-04-08 PROCEDURE — 3008F BODY MASS INDEX DOCD: CPT | Performed by: PHYSICIAN ASSISTANT

## 2025-04-08 PROCEDURE — 99213 OFFICE O/P EST LOW 20 MIN: CPT | Performed by: PHYSICIAN ASSISTANT

## 2025-04-08 RX ORDER — DOXYCYCLINE 100 MG/1
100 TABLET ORAL 2 TIMES DAILY
Qty: 10 TABLET | Refills: 0 | Status: SHIPPED | OUTPATIENT
Start: 2025-04-08 | End: 2025-04-13

## 2025-04-08 ASSESSMENT — PATIENT HEALTH QUESTIONNAIRE - PHQ9
2. FEELING DOWN, DEPRESSED OR HOPELESS: NOT AT ALL
1. LITTLE INTEREST OR PLEASURE IN DOING THINGS: NOT AT ALL
SUM OF ALL RESPONSES TO PHQ9 QUESTIONS 1 AND 2: 0

## 2025-04-08 ASSESSMENT — PAIN SCALES - GENERAL: PAINLEVEL_OUTOF10: 2

## 2025-04-08 ASSESSMENT — ENCOUNTER SYMPTOMS
OCCASIONAL FEELINGS OF UNSTEADINESS: 0
LOSS OF SENSATION IN FEET: 0
DEPRESSION: 0

## 2025-04-08 NOTE — PROGRESS NOTES
"Subjective   Patient ID: Matt Leiva Jr. \"Dimas\" is a 39 y.o. male. They present today with a chief complaint of Nasal Congestion (Sinus congestion over 2 weeks. ).    History of Present Illness  Patient is a 39-year-old male who presents with nasal congestion over the past 2 weeks.  Patient states that he was started on methotrexate last year for rheumatoid arthritis and has had multiple sinus infections since this.  States that he has been using Afrin for his symptoms.  Notes associated mild headache and teeth pain.    Past Medical History  Allergies as of 04/08/2025 - Reviewed 04/08/2025   Allergen Reaction Noted    Sulfa (sulfonamide antibiotics) Unknown 01/13/2022       (Not in a hospital admission)       No past medical history on file.    No past surgical history on file.     reports that he has never smoked. He has never used smokeless tobacco.    Review of Systems  ROS is negative unless otherwise stated in HPI.         Objective    Vitals:    04/08/25 1817   BP: (!) 153/91   Pulse: 93   Resp: 16   Temp: 36.6 °C (97.8 °F)   SpO2: 98%   Weight: 109 kg (240 lb)   Height: 1.829 m (6')     No LMP for male patient.      VS: As documented in the triage note and EMR flowsheet from this visit was reviewed  General: Well appearing. No acute distress.   Eyes:  Extraocular movements grossly intact. No scleral icterus.   Head: Atraumatic. Normocephalic.     Neck: No meningismus. No gross masses. Full movement through range of motion  ENT: Tenderness to the maxillary and frontal sinuses.  CV: Regular rhythm. No murmurs, rubs, gallops appreciated.   Resp: Clear to auscultation bilaterally. No respiratory distress.    Skin: Warm, dry. No rashes  Neuro: CN II-VII intact. A&O x3. Speech fluent. Alert. Moving all extremities. Ambulates with normal gait  Psych: Appropriate mood and affect for situation      Point of Care Test & Imaging Results from this visit  No results found for this visit on 04/08/25.   Imaging  No results " found.    Cardiology, Vascular, and Other Imaging  No other imaging results found for the past 2 days      Diagnostic study results (if any) were reviewed by Kaitlyn Elliott PA-C.    Assessment/Plan   Allergies, medications, history, and pertinent labs/EKGs/Imaging reviewed by Kaitlyn Elliott PA-C.     Medical Decision Making  Patient is a 39-year-old male who presents with nasal congestion for the past 2 weeks.  Notes multiple sinus infections since starting methotrexate.  Vitals are stable.  Noted maxillary and frontal sinus tenderness to palpation on examination.  Noted drug interaction with Augmentin and methotrexate.  Will treat bacterial rhinosinusitis with doxycycline.    Orders and Diagnoses  Diagnoses and all orders for this visit:  Bacterial sinusitis  -     doxycycline (Adoxa) 100 mg tablet; Take 1 tablet (100 mg) by mouth 2 times a day for 5 days. Take with a full glass of water and do not lie down for at least 30 minutes after      Medical Admin Record      Patient disposition: Home    Electronically signed by Kaitlyn Elliott PA-C  6:47 PM